# Patient Record
Sex: FEMALE | Race: WHITE | Employment: OTHER | URBAN - METROPOLITAN AREA
[De-identification: names, ages, dates, MRNs, and addresses within clinical notes are randomized per-mention and may not be internally consistent; named-entity substitution may affect disease eponyms.]

---

## 2017-01-19 ENCOUNTER — OFFICE VISIT (OUTPATIENT)
Dept: INTERNAL MEDICINE CLINIC | Age: 82
End: 2017-01-19

## 2017-01-19 VITALS
HEART RATE: 60 BPM | DIASTOLIC BLOOD PRESSURE: 70 MMHG | SYSTOLIC BLOOD PRESSURE: 130 MMHG | BODY MASS INDEX: 24.35 KG/M2 | WEIGHT: 124 LBS | HEIGHT: 60 IN

## 2017-01-19 DIAGNOSIS — M25.511 ACUTE PAIN OF RIGHT SHOULDER: Primary | ICD-10-CM

## 2017-01-19 PROCEDURE — 99213 OFFICE O/P EST LOW 20 MIN: CPT | Performed by: INTERNAL MEDICINE

## 2017-01-19 PROCEDURE — 20610 DRAIN/INJ JOINT/BURSA W/O US: CPT | Performed by: INTERNAL MEDICINE

## 2017-01-19 RX ORDER — TRIAMCINOLONE ACETONIDE 40 MG/ML
40 INJECTION, SUSPENSION INTRA-ARTICULAR; INTRAMUSCULAR ONCE
Status: DISCONTINUED | OUTPATIENT
Start: 2017-01-19 | End: 2017-12-22

## 2017-01-23 ENCOUNTER — OFFICE VISIT (OUTPATIENT)
Dept: INTERNAL MEDICINE CLINIC | Age: 82
End: 2017-01-23

## 2017-01-23 VITALS
SYSTOLIC BLOOD PRESSURE: 138 MMHG | DIASTOLIC BLOOD PRESSURE: 72 MMHG | BODY MASS INDEX: 24.35 KG/M2 | WEIGHT: 124 LBS | HEIGHT: 60 IN | HEART RATE: 88 BPM

## 2017-01-23 DIAGNOSIS — M25.511 ACUTE PAIN OF RIGHT SHOULDER: Primary | ICD-10-CM

## 2017-01-23 PROCEDURE — 99213 OFFICE O/P EST LOW 20 MIN: CPT | Performed by: INTERNAL MEDICINE

## 2017-02-14 ENCOUNTER — TELEPHONE (OUTPATIENT)
Dept: OTHER | Facility: CLINIC | Age: 82
End: 2017-02-14

## 2017-03-23 ENCOUNTER — OFFICE VISIT (OUTPATIENT)
Dept: INTERNAL MEDICINE CLINIC | Age: 82
End: 2017-03-23

## 2017-03-23 VITALS
HEIGHT: 60 IN | WEIGHT: 118 LBS | SYSTOLIC BLOOD PRESSURE: 118 MMHG | DIASTOLIC BLOOD PRESSURE: 70 MMHG | HEART RATE: 72 BPM | BODY MASS INDEX: 23.16 KG/M2

## 2017-03-23 DIAGNOSIS — N18.30 CKD (CHRONIC KIDNEY DISEASE), STAGE III (HCC): ICD-10-CM

## 2017-03-23 DIAGNOSIS — E78.5 DYSLIPIDEMIA: ICD-10-CM

## 2017-03-23 DIAGNOSIS — I10 ESSENTIAL HYPERTENSION, BENIGN: Primary | ICD-10-CM

## 2017-03-23 LAB
ALBUMIN SERPL-MCNC: 3.8 G/DL (ref 3.4–5)
ANION GAP SERPL CALCULATED.3IONS-SCNC: 18 MMOL/L (ref 3–16)
BUN BLDV-MCNC: 21 MG/DL (ref 7–20)
CALCIUM SERPL-MCNC: 10.4 MG/DL (ref 8.3–10.6)
CHLORIDE BLD-SCNC: 99 MMOL/L (ref 99–110)
CHOLESTEROL, TOTAL: 155 MG/DL (ref 0–199)
CO2: 25 MMOL/L (ref 21–32)
CREAT SERPL-MCNC: 1.1 MG/DL (ref 0.6–1.2)
GFR AFRICAN AMERICAN: 57
GFR NON-AFRICAN AMERICAN: 47
GLUCOSE BLD-MCNC: 91 MG/DL (ref 70–99)
HDLC SERPL-MCNC: 67 MG/DL (ref 40–60)
LDL CHOLESTEROL CALCULATED: 63 MG/DL
PHOSPHORUS: 3.7 MG/DL (ref 2.5–4.9)
POTASSIUM SERPL-SCNC: 4.6 MMOL/L (ref 3.5–5.1)
SODIUM BLD-SCNC: 142 MMOL/L (ref 136–145)
TRIGL SERPL-MCNC: 126 MG/DL (ref 0–150)
VLDLC SERPL CALC-MCNC: 25 MG/DL

## 2017-03-23 PROCEDURE — 99213 OFFICE O/P EST LOW 20 MIN: CPT | Performed by: INTERNAL MEDICINE

## 2017-05-20 ENCOUNTER — HOSPITAL ENCOUNTER (OUTPATIENT)
Dept: OTHER | Age: 82
Discharge: HOME | End: 2017-05-20
Attending: FAMILY MEDICINE | Admitting: FAMILY MEDICINE

## 2017-05-20 DIAGNOSIS — R52 PAIN: ICD-10-CM

## 2017-09-19 ENCOUNTER — OFFICE VISIT (OUTPATIENT)
Dept: INTERNAL MEDICINE CLINIC | Age: 82
End: 2017-09-19

## 2017-09-19 VITALS
HEART RATE: 64 BPM | BODY MASS INDEX: 22.5 KG/M2 | DIASTOLIC BLOOD PRESSURE: 80 MMHG | WEIGHT: 114.6 LBS | SYSTOLIC BLOOD PRESSURE: 130 MMHG | HEIGHT: 60 IN

## 2017-09-19 DIAGNOSIS — L72.3 SEBACEOUS CYST: ICD-10-CM

## 2017-09-19 DIAGNOSIS — I10 ESSENTIAL HYPERTENSION: Primary | ICD-10-CM

## 2017-09-19 PROCEDURE — 3288F FALL RISK ASSESSMENT DOCD: CPT | Performed by: INTERNAL MEDICINE

## 2017-09-19 PROCEDURE — G8510 SCR DEP NEG, NO PLAN REQD: HCPCS | Performed by: INTERNAL MEDICINE

## 2017-09-19 PROCEDURE — 99213 OFFICE O/P EST LOW 20 MIN: CPT | Performed by: INTERNAL MEDICINE

## 2017-09-19 ASSESSMENT — PATIENT HEALTH QUESTIONNAIRE - PHQ9
2. FEELING DOWN, DEPRESSED OR HOPELESS: 0
SUM OF ALL RESPONSES TO PHQ9 QUESTIONS 1 & 2: 0
SUM OF ALL RESPONSES TO PHQ QUESTIONS 1-9: 0
1. LITTLE INTEREST OR PLEASURE IN DOING THINGS: 0

## 2017-09-25 ENCOUNTER — OFFICE VISIT (OUTPATIENT)
Dept: SURGERY | Age: 82
End: 2017-09-25

## 2017-09-25 VITALS
WEIGHT: 119 LBS | DIASTOLIC BLOOD PRESSURE: 60 MMHG | HEIGHT: 63 IN | SYSTOLIC BLOOD PRESSURE: 126 MMHG | BODY MASS INDEX: 21.09 KG/M2

## 2017-09-25 DIAGNOSIS — L72.3 SEBACEOUS CYST: Primary | ICD-10-CM

## 2017-09-25 PROCEDURE — 99201 PR OFFICE OUTPATIENT NEW 10 MINUTES: CPT | Performed by: SURGERY

## 2017-09-25 ASSESSMENT — ENCOUNTER SYMPTOMS
EYES NEGATIVE: 1
GASTROINTESTINAL NEGATIVE: 1
RESPIRATORY NEGATIVE: 1
ALLERGIC/IMMUNOLOGIC NEGATIVE: 1

## 2017-09-27 ENCOUNTER — PROCEDURE VISIT (OUTPATIENT)
Dept: SURGERY | Age: 82
End: 2017-09-27

## 2017-09-27 VITALS — DIASTOLIC BLOOD PRESSURE: 66 MMHG | SYSTOLIC BLOOD PRESSURE: 122 MMHG

## 2017-09-27 DIAGNOSIS — L72.3 SEBACEOUS CYST: Primary | ICD-10-CM

## 2017-09-27 PROCEDURE — 12032 INTMD RPR S/A/T/EXT 2.6-7.5: CPT | Performed by: SURGERY

## 2017-09-27 PROCEDURE — 11402 EXC TR-EXT B9+MARG 1.1-2 CM: CPT | Performed by: SURGERY

## 2017-10-11 ENCOUNTER — OFFICE VISIT (OUTPATIENT)
Dept: SURGERY | Age: 82
End: 2017-10-11

## 2017-10-11 VITALS — SYSTOLIC BLOOD PRESSURE: 144 MMHG | BODY MASS INDEX: 20.19 KG/M2 | WEIGHT: 114 LBS | DIASTOLIC BLOOD PRESSURE: 60 MMHG

## 2017-10-11 DIAGNOSIS — L72.3 SEBACEOUS CYST: Primary | ICD-10-CM

## 2017-10-11 PROCEDURE — 99024 POSTOP FOLLOW-UP VISIT: CPT | Performed by: SURGERY

## 2017-10-11 NOTE — PROGRESS NOTES
Subjective:      Patient ID: Carolina Patino is a 80 y.o. female. HPI    Review of Systems    Objective:   Physical Exam  Vision healing well  Assessment:      43-year-old female status post excision of a sebaceous cyst of the upper back. Doing well postoperatively. Plan:      Follow-up as needed.

## 2017-12-15 ENCOUNTER — OFFICE VISIT (OUTPATIENT)
Dept: INTERNAL MEDICINE CLINIC | Age: 82
End: 2017-12-15

## 2017-12-15 VITALS
SYSTOLIC BLOOD PRESSURE: 136 MMHG | WEIGHT: 116 LBS | DIASTOLIC BLOOD PRESSURE: 84 MMHG | HEART RATE: 80 BPM | BODY MASS INDEX: 20.55 KG/M2 | HEIGHT: 63 IN

## 2017-12-15 DIAGNOSIS — M25.511 ACUTE PAIN OF RIGHT SHOULDER: Primary | ICD-10-CM

## 2017-12-15 PROCEDURE — 99213 OFFICE O/P EST LOW 20 MIN: CPT | Performed by: NURSE PRACTITIONER

## 2017-12-18 ASSESSMENT — ENCOUNTER SYMPTOMS: SHORTNESS OF BREATH: 0

## 2017-12-22 ENCOUNTER — OFFICE VISIT (OUTPATIENT)
Dept: INTERNAL MEDICINE CLINIC | Age: 82
End: 2017-12-22

## 2017-12-22 VITALS
HEART RATE: 72 BPM | DIASTOLIC BLOOD PRESSURE: 84 MMHG | SYSTOLIC BLOOD PRESSURE: 132 MMHG | WEIGHT: 114 LBS | HEIGHT: 63 IN | BODY MASS INDEX: 20.2 KG/M2

## 2017-12-22 DIAGNOSIS — M25.511 CHRONIC RIGHT SHOULDER PAIN: Primary | ICD-10-CM

## 2017-12-22 DIAGNOSIS — G89.29 CHRONIC RIGHT SHOULDER PAIN: Primary | ICD-10-CM

## 2017-12-22 PROCEDURE — 99213 OFFICE O/P EST LOW 20 MIN: CPT | Performed by: NURSE PRACTITIONER

## 2017-12-22 PROCEDURE — 20610 DRAIN/INJ JOINT/BURSA W/O US: CPT | Performed by: NURSE PRACTITIONER

## 2017-12-22 RX ORDER — LIDOCAINE HYDROCHLORIDE 10 MG/ML
1.5 INJECTION, SOLUTION EPIDURAL; INFILTRATION; INTRACAUDAL; PERINEURAL ONCE
Status: DISCONTINUED | OUTPATIENT
Start: 2017-12-22 | End: 2019-06-03

## 2017-12-22 RX ORDER — TRIAMCINOLONE ACETONIDE 40 MG/ML
20 INJECTION, SUSPENSION INTRA-ARTICULAR; INTRAMUSCULAR ONCE
Status: DISCONTINUED | OUTPATIENT
Start: 2017-12-22 | End: 2019-06-03

## 2017-12-22 NOTE — PROGRESS NOTES
Subjective:      Patient ID: Andrae Gaviria is a 80 y.o. female. CC: Right shoulder pain    HPI: Point of 2-3 days of right shoulder pain with no known injuries or traumas. Her discomfort was located on the lateral shoulder and lateral upper arm. Tenderness of the posterior lateral shoulder. The source of her pain was felt to be degenerative in nature with possible bursitis. She was placed on an anti-inflammatory regimen including Tylenol, heat and ice and asked to perform shoulder exercises. She has been doing this but returns today with ongoing shoulder pain. It is not worse but it also is minimally, if at all, improved. Review of Systems   Musculoskeletal: Positive for arthralgias. Objective:   Physical Exam   Constitutional: She appears well-developed and well-nourished. No distress. HENT:   Head: Normocephalic and atraumatic. Musculoskeletal:        Right shoulder: She exhibits tenderness, crepitus and pain. She exhibits normal range of motion, no bony tenderness, no swelling, no deformity and normal strength. There is some tenderness to palpation over the subacromial space with no appreciable swelling. She also has tenderness on the posterior shoulder. She has crepitus with shoulder rotation. Her range of motion appears grossly intact and she denies any pain with extension, flexion, internal, and external rotation. Skin: Skin is warm and dry. No rash noted. She is not diaphoretic. /84   Pulse 72   Ht 5' 3\" (1.6 m)   Wt 114 lb (51.7 kg)   BMI 20.19 kg/m²         Assessment/Plan:  Gorge Munoz was seen today for follow-up. Diagnoses and all orders for this visit:    Chronic right shoulder pain  - No relief with conservative measures including Tylenol, heat and ice, or exercises  - Discuss next steps including x-rays or joint injection.   She would like to proceed with steroid injection  - See procedure note  -     [id number] - SC DRAIN/INJECT LARGE JOINT/BURSA  -

## 2018-03-06 ENCOUNTER — TELEPHONE (OUTPATIENT)
Dept: INTERNAL MEDICINE CLINIC | Age: 83
End: 2018-03-06

## 2018-03-06 DIAGNOSIS — E78.00 HYPERCHOLESTEROLEMIA: Primary | ICD-10-CM

## 2018-03-07 NOTE — TELEPHONE ENCOUNTER
Dr. Amanda Lindsey: This patient has an upcoming appointment with you for Hyperlipidemia. In planning for that visit I have completed the following pre-visit planning:     Pre-Visit Planning Checklist:  Patient contacted: yes  Verified patient by name and date of birth: yes    Health Maintenance items reviewed:    Pneumonia Vaccination:  patient would like to discuss at next visit. Labs and procedures pended: Fasting Lipid Panel  and Renal Panel    Labs and procedures discussed with patient: yes  Reminded patient to check with their insurance company about coverage for lab tests and lab location: yes    Preliminary Medication Reconciliation: was performed. Reminded patient to bring medications to appointment: no    Reminded patient to arrive early: yes    Other notes: Patient will have lab work completed at her upcoming appointment. Please complete the med-reconciliation and sign the appropriate labs as soon as possible.       Claudia Savage  Pre-Services Specialist

## 2018-03-19 ENCOUNTER — OFFICE VISIT (OUTPATIENT)
Dept: INTERNAL MEDICINE CLINIC | Age: 83
End: 2018-03-19

## 2018-03-19 VITALS
WEIGHT: 112 LBS | DIASTOLIC BLOOD PRESSURE: 84 MMHG | SYSTOLIC BLOOD PRESSURE: 128 MMHG | HEIGHT: 63 IN | BODY MASS INDEX: 19.84 KG/M2 | HEART RATE: 72 BPM

## 2018-03-19 DIAGNOSIS — I10 ESSENTIAL HYPERTENSION, BENIGN: Primary | ICD-10-CM

## 2018-03-19 DIAGNOSIS — Z23 NEED FOR PNEUMOCOCCAL VACCINATION: ICD-10-CM

## 2018-03-19 PROCEDURE — 99213 OFFICE O/P EST LOW 20 MIN: CPT | Performed by: INTERNAL MEDICINE

## 2018-03-19 PROCEDURE — G0009 ADMIN PNEUMOCOCCAL VACCINE: HCPCS | Performed by: INTERNAL MEDICINE

## 2018-03-19 PROCEDURE — 90732 PPSV23 VACC 2 YRS+ SUBQ/IM: CPT | Performed by: INTERNAL MEDICINE

## 2018-09-06 ENCOUNTER — TELEPHONE (OUTPATIENT)
Dept: FAMILY MEDICINE CLINIC | Age: 83
End: 2018-09-06

## 2018-09-06 DIAGNOSIS — E78.00 HYPERCHOLESTEROLEMIA: Primary | ICD-10-CM

## 2018-09-18 ENCOUNTER — OFFICE VISIT (OUTPATIENT)
Dept: INTERNAL MEDICINE CLINIC | Age: 83
End: 2018-09-18

## 2018-09-18 VITALS
HEIGHT: 63 IN | SYSTOLIC BLOOD PRESSURE: 138 MMHG | HEART RATE: 68 BPM | BODY MASS INDEX: 19.67 KG/M2 | DIASTOLIC BLOOD PRESSURE: 82 MMHG | WEIGHT: 111 LBS

## 2018-09-18 DIAGNOSIS — N18.30 CKD (CHRONIC KIDNEY DISEASE), STAGE III (HCC): ICD-10-CM

## 2018-09-18 DIAGNOSIS — I10 ESSENTIAL HYPERTENSION, BENIGN: Primary | ICD-10-CM

## 2018-09-18 LAB
ALBUMIN SERPL-MCNC: 4.2 G/DL (ref 3.4–5)
ANION GAP SERPL CALCULATED.3IONS-SCNC: 14 MMOL/L (ref 3–16)
BUN BLDV-MCNC: 16 MG/DL (ref 7–20)
CALCIUM SERPL-MCNC: 10.1 MG/DL (ref 8.3–10.6)
CHLORIDE BLD-SCNC: 103 MMOL/L (ref 99–110)
CO2: 25 MMOL/L (ref 21–32)
CREAT SERPL-MCNC: 0.9 MG/DL (ref 0.6–1.2)
GFR AFRICAN AMERICAN: >60
GFR NON-AFRICAN AMERICAN: 59
GLUCOSE BLD-MCNC: 89 MG/DL (ref 70–99)
PHOSPHORUS: 3.3 MG/DL (ref 2.5–4.9)
POTASSIUM SERPL-SCNC: 4.8 MMOL/L (ref 3.5–5.1)
SODIUM BLD-SCNC: 142 MMOL/L (ref 136–145)

## 2018-09-18 PROCEDURE — G8510 SCR DEP NEG, NO PLAN REQD: HCPCS | Performed by: INTERNAL MEDICINE

## 2018-09-18 PROCEDURE — 99213 OFFICE O/P EST LOW 20 MIN: CPT | Performed by: INTERNAL MEDICINE

## 2018-09-18 ASSESSMENT — PATIENT HEALTH QUESTIONNAIRE - PHQ9
1. LITTLE INTEREST OR PLEASURE IN DOING THINGS: 0
SUM OF ALL RESPONSES TO PHQ QUESTIONS 1-9: 0
2. FEELING DOWN, DEPRESSED OR HOPELESS: 0
SUM OF ALL RESPONSES TO PHQ QUESTIONS 1-9: 0
SUM OF ALL RESPONSES TO PHQ9 QUESTIONS 1 & 2: 0

## 2019-01-29 ENCOUNTER — OFFICE VISIT (OUTPATIENT)
Dept: INTERNAL MEDICINE CLINIC | Age: 84
End: 2019-01-29
Payer: MEDICARE

## 2019-01-29 VITALS
HEART RATE: 84 BPM | SYSTOLIC BLOOD PRESSURE: 132 MMHG | BODY MASS INDEX: 19.67 KG/M2 | HEIGHT: 63 IN | DIASTOLIC BLOOD PRESSURE: 84 MMHG | WEIGHT: 111 LBS

## 2019-01-29 DIAGNOSIS — Z01.818 PREOP EXAMINATION: Primary | ICD-10-CM

## 2019-01-29 DIAGNOSIS — H25.13 AGE-RELATED NUCLEAR CATARACT OF BOTH EYES: ICD-10-CM

## 2019-01-29 PROCEDURE — 99214 OFFICE O/P EST MOD 30 MIN: CPT | Performed by: NURSE PRACTITIONER

## 2019-06-03 ENCOUNTER — OFFICE VISIT (OUTPATIENT)
Dept: INTERNAL MEDICINE CLINIC | Age: 84
End: 2019-06-03
Payer: MEDICARE

## 2019-06-03 VITALS
HEART RATE: 64 BPM | WEIGHT: 106 LBS | HEIGHT: 63 IN | TEMPERATURE: 98.2 F | SYSTOLIC BLOOD PRESSURE: 130 MMHG | DIASTOLIC BLOOD PRESSURE: 72 MMHG | BODY MASS INDEX: 18.78 KG/M2

## 2019-06-03 DIAGNOSIS — R19.7 ACUTE DIARRHEA: Primary | ICD-10-CM

## 2019-06-03 PROCEDURE — 99213 OFFICE O/P EST LOW 20 MIN: CPT | Performed by: INTERNAL MEDICINE

## 2019-06-07 ENCOUNTER — TELEPHONE (OUTPATIENT)
Dept: INTERNAL MEDICINE CLINIC | Age: 84
End: 2019-06-07

## 2019-06-07 DIAGNOSIS — R19.7 DIARRHEA, UNSPECIFIED TYPE: Primary | ICD-10-CM

## 2019-06-07 RX ORDER — DIPHENOXYLATE HYDROCHLORIDE AND ATROPINE SULFATE 2.5; .025 MG/1; MG/1
1 TABLET ORAL 4 TIMES DAILY PRN
Qty: 20 TABLET | Refills: 0 | Status: SHIPPED | OUTPATIENT
Start: 2019-06-07 | End: 2019-06-17

## 2019-06-07 NOTE — TELEPHONE ENCOUNTER
Pt was in on Mon but she has developed diarrhea since then. She is taking imodium which is helping some.     Kathy on file

## 2019-12-03 ENCOUNTER — OFFICE VISIT (OUTPATIENT)
Dept: INTERNAL MEDICINE CLINIC | Age: 84
End: 2019-12-03
Payer: MEDICARE

## 2019-12-03 VITALS
SYSTOLIC BLOOD PRESSURE: 126 MMHG | HEART RATE: 64 BPM | DIASTOLIC BLOOD PRESSURE: 68 MMHG | HEIGHT: 63 IN | WEIGHT: 103 LBS | BODY MASS INDEX: 18.25 KG/M2

## 2019-12-03 DIAGNOSIS — I10 ESSENTIAL HYPERTENSION, BENIGN: Primary | ICD-10-CM

## 2019-12-03 DIAGNOSIS — N18.30 CKD (CHRONIC KIDNEY DISEASE), STAGE III (HCC): ICD-10-CM

## 2019-12-03 LAB
ALBUMIN SERPL-MCNC: 3.8 G/DL (ref 3.4–5)
ANION GAP SERPL CALCULATED.3IONS-SCNC: 13 MMOL/L (ref 3–16)
BUN BLDV-MCNC: 17 MG/DL (ref 7–20)
CALCIUM SERPL-MCNC: 9.6 MG/DL (ref 8.3–10.6)
CHLORIDE BLD-SCNC: 103 MMOL/L (ref 99–110)
CO2: 24 MMOL/L (ref 21–32)
CREAT SERPL-MCNC: 1.1 MG/DL (ref 0.6–1.2)
GFR AFRICAN AMERICAN: 56
GFR NON-AFRICAN AMERICAN: 47
GLUCOSE BLD-MCNC: 102 MG/DL (ref 70–99)
PHOSPHORUS: 3.3 MG/DL (ref 2.5–4.9)
POTASSIUM SERPL-SCNC: 4.4 MMOL/L (ref 3.5–5.1)
SODIUM BLD-SCNC: 140 MMOL/L (ref 136–145)

## 2019-12-03 PROCEDURE — 99213 OFFICE O/P EST LOW 20 MIN: CPT | Performed by: INTERNAL MEDICINE

## 2020-04-13 ENCOUNTER — TELEPHONE (OUTPATIENT)
Dept: INTERNAL MEDICINE CLINIC | Age: 85
End: 2020-04-13

## 2020-04-13 ENCOUNTER — VIRTUAL VISIT (OUTPATIENT)
Dept: INTERNAL MEDICINE CLINIC | Age: 85
End: 2020-04-13
Payer: MEDICARE

## 2020-04-13 PROCEDURE — 99213 OFFICE O/P EST LOW 20 MIN: CPT | Performed by: NURSE PRACTITIONER

## 2020-04-13 ASSESSMENT — ENCOUNTER SYMPTOMS
SINUS PAIN: 0
SINUS PRESSURE: 0
SORE THROAT: 0
FACIAL SWELLING: 1

## 2020-04-13 NOTE — PROGRESS NOTES
2020    TELEHEALTH EVALUATION -- Audio/Visual (During [facility]- public health emergency)    HPI:    Tiffanie Hill (:  1929) has requested an audio/video evaluation for the following concern(s):    Left facial and ear pain for 1 day. Pain left side of face, jaw, ear. No earache, ear drainage, fevers. No known injury or trauma. No dental concerns as all teeth pulled from upper jaw. No treatments at home. Review of Systems   Constitutional: Negative for fever. HENT: Positive for ear pain and facial swelling. Negative for congestion, dental problem, ear discharge, hearing loss, mouth sores, sinus pressure, sinus pain and sore throat. Prior to Visit Medications    Medication Sig Taking? Authorizing Provider   aspirin 81 MG tablet Take 81 mg by mouth daily as needed for Pain   Historical Provider, MD   Multiple Vitamin (MULTIVITAMIN PO) Take 1 tablet by mouth daily. Historical Provider, MD       Social History     Tobacco Use    Smoking status: Never Smoker    Smokeless tobacco: Never Used   Substance Use Topics    Alcohol use: Yes     Comment:  RARE GLASS OF WINE    Drug use: No            PHYSICAL EXAMINATION:  [ INSTRUCTIONS:  \"[x]\" Indicates a positive item  \"[]\" Indicates a negative item  -- DELETE ALL ITEMS NOT EXAMINED]  Vital Signs: (As obtained by patient/caregiver or practitioner observation)    Blood pressure-  Heart rate-    Respiratory rate-    Temperature-  Pulse oximetry-     Constitutional: [x] Appears well-developed and well-nourished [x] No apparent distress      [] Abnormal-   Mental status  [x] Alert and awake  [x] Oriented to person/place/time [x]Able to follow commands      Eyes:  EOM    [x]  Normal  [] Abnormal-  Sclera  [x]  Normal  [] Abnormal -         Discharge [x]  None visible  [] Abnormal -    HENT:   [x] Normocephalic, atraumatic.   [] Abnormal - points out area of pains seems to be around left TMJ  [x] Mouth/Throat: Mucous membranes are moist.     External Ears [x] Normal  [] Abnormal-     Neck: [x] No visualized mass     Pulmonary/Chest: [x] Respiratory effort normal.  [x] No visualized signs of difficulty breathing or respiratory distress        [] Abnormal-      Musculoskeletal:   [] Normal gait with no signs of ataxia         [x] Normal range of motion of neck        [] Abnormal-       Neurological:        [x] No Facial Asymmetry (Cranial nerve 7 motor function) (limited exam to video visit)          [x] No gaze palsy        [] Abnormal-         Skin:        [x] No significant exanthematous lesions or discoloration noted on facial skin         [] Abnormal-            Psychiatric:       [x] Normal Affect [x] No Hallucinations        [] Abnormal-     Other pertinent observable physical exam findings-       ASSESSMENT/PLAN:  1. Left facial pain  - Seems to be around TMJ area by description  - Symptoms for 1 day, no treatments at home  - Will try Tylenol, cool/warm compresses  - If worsening, could try muscle relaxant, steroid but risky with age. May need face to face appt for hands on exam      No follow-ups on file. Juan Pablo Coleman is a 80 y.o. female being evaluated by a Virtual Visit (video visit) encounter to address concerns as mentioned above. A caregiver was present when appropriate. Due to this being a TeleHealth encounter (During TVYKY-88 public health emergency), evaluation of the following organ systems was limited: Vitals/Constitutional/EENT/Resp/CV/GI//MS/Neuro/Skin/Heme-Lymph-Imm. Pursuant to the emergency declaration under the [facility], [address] [place] [address] authority and the [employer] and [employer] Act, this Virtual Visit was conducted with patient's (and/or legal guardian's) consent, to reduce the patient's risk of exposure to COVID-19 and provide necessary medical care.   The patient (and/or legal guardian) has also been advised to contact this office for worsening conditions or problems, and seek emergency medical treatment and/or call 911 if deemed necessary. Services were provided through a video synchronous discussion virtually to substitute for in-person clinic visit. Patient and provider were located at their individual homes. --QUYNH Carroll CNP on 4/13/2020 at 1:59 PM    An electronic signature was used to authenticate this note.

## 2020-04-13 NOTE — TELEPHONE ENCOUNTER
PT called to say her left ear and side of face is swollen, uncomfortable and in a little bit of pain-aches, when chewing. It's difficult to eat. Can not do VV. Please call to advise.

## 2020-06-01 ENCOUNTER — TELEPHONE (OUTPATIENT)
Dept: INTERNAL MEDICINE CLINIC | Age: 85
End: 2020-06-01

## 2020-06-01 NOTE — TELEPHONE ENCOUNTER
ECC received a call from:    Name of Caller: Greg Dickson    Relationship to patient: daughter    Organization name: n/a    Best contact number: [phone]    Reason for call: PT's daughter called to cancel appointment for 6/3 because PT is doing well. They need to reschedule for in office.  Please call back

## 2020-09-03 ENCOUNTER — VIRTUAL VISIT (OUTPATIENT)
Dept: INTERNAL MEDICINE CLINIC | Age: 85
End: 2020-09-03
Payer: MEDICARE

## 2020-09-03 DIAGNOSIS — R11.0 NAUSEA: ICD-10-CM

## 2020-09-03 DIAGNOSIS — R42 LIGHTHEADEDNESS: ICD-10-CM

## 2020-09-03 LAB
ANION GAP SERPL CALCULATED.3IONS-SCNC: 14 MMOL/L (ref 3–16)
BILIRUBIN URINE: ABNORMAL
BLOOD, URINE: NEGATIVE
BUN BLDV-MCNC: 18 MG/DL (ref 7–20)
CALCIUM SERPL-MCNC: 9.9 MG/DL (ref 8.3–10.6)
CHLORIDE BLD-SCNC: 104 MMOL/L (ref 99–110)
CLARITY: ABNORMAL
CO2: 23 MMOL/L (ref 21–32)
COLOR: ABNORMAL
CREAT SERPL-MCNC: 0.9 MG/DL (ref 0.6–1.2)
CRYSTALS, UA: ABNORMAL /HPF
EPITHELIAL CELLS, UA: 4 /HPF (ref 0–5)
GFR AFRICAN AMERICAN: >60
GFR NON-AFRICAN AMERICAN: 59
GLUCOSE BLD-MCNC: 141 MG/DL (ref 70–99)
GLUCOSE URINE: NEGATIVE MG/DL
HCT VFR BLD CALC: 44.4 % (ref 36–48)
HEMOGLOBIN: 14.9 G/DL (ref 12–16)
HYALINE CASTS: 2 /LPF (ref 0–8)
KETONES, URINE: NEGATIVE MG/DL
LEUKOCYTE ESTERASE, URINE: NEGATIVE
MCH RBC QN AUTO: 31.8 PG (ref 26–34)
MCHC RBC AUTO-ENTMCNC: 33.6 G/DL (ref 31–36)
MCV RBC AUTO: 94.6 FL (ref 80–100)
MICROSCOPIC EXAMINATION: YES
NITRITE, URINE: NEGATIVE
PDW BLD-RTO: 14.7 % (ref 12.4–15.4)
PH UA: 5.5 (ref 5–8)
PLATELET # BLD: 234 K/UL (ref 135–450)
PMV BLD AUTO: 8.1 FL (ref 5–10.5)
POTASSIUM SERPL-SCNC: 4.2 MMOL/L (ref 3.5–5.1)
PROTEIN UA: ABNORMAL MG/DL
RBC # BLD: 4.69 M/UL (ref 4–5.2)
RBC UA: 4 /HPF (ref 0–4)
SODIUM BLD-SCNC: 141 MMOL/L (ref 136–145)
SPECIFIC GRAVITY UA: 1.03 (ref 1–1.03)
URINE TYPE: ABNORMAL
UROBILINOGEN, URINE: 1 E.U./DL
WBC # BLD: 9.2 K/UL (ref 4–11)
WBC UA: 8 /HPF (ref 0–5)

## 2020-09-03 PROCEDURE — 3288F FALL RISK ASSESSMENT DOCD: CPT | Performed by: NURSE PRACTITIONER

## 2020-09-03 PROCEDURE — 99213 OFFICE O/P EST LOW 20 MIN: CPT | Performed by: NURSE PRACTITIONER

## 2020-09-03 PROCEDURE — G8510 SCR DEP NEG, NO PLAN REQD: HCPCS | Performed by: NURSE PRACTITIONER

## 2020-09-03 RX ORDER — ONDANSETRON 4 MG/1
4 TABLET, ORALLY DISINTEGRATING ORAL 3 TIMES DAILY PRN
Qty: 21 TABLET | Refills: 0 | Status: SHIPPED | OUTPATIENT
Start: 2020-09-03 | End: 2020-10-12 | Stop reason: ALTCHOICE

## 2020-09-03 ASSESSMENT — ENCOUNTER SYMPTOMS
CONSTIPATION: 0
NAUSEA: 1
SHORTNESS OF BREATH: 0
WHEEZING: 0
CHEST TIGHTNESS: 0
COUGH: 0
ABDOMINAL PAIN: 0
VOMITING: 0
DIARRHEA: 0
BLOOD IN STOOL: 0

## 2020-09-03 ASSESSMENT — PATIENT HEALTH QUESTIONNAIRE - PHQ9
SUM OF ALL RESPONSES TO PHQ9 QUESTIONS 1 & 2: 0
SUM OF ALL RESPONSES TO PHQ QUESTIONS 1-9: 0
2. FEELING DOWN, DEPRESSED OR HOPELESS: 0
1. LITTLE INTEREST OR PLEASURE IN DOING THINGS: 0
SUM OF ALL RESPONSES TO PHQ QUESTIONS 1-9: 0

## 2020-09-03 NOTE — PROGRESS NOTES
9/3/2020    TELEHEALTH EVALUATION -- Audio/Visual (During ECNCU-37 public health emergency)    HPI:    Jayce Landa (:  1929) has requested an audio/video evaluation for the following concern(s):    VV for nausea and lightheaded that started yesterday   She denies vertigo or dizziness. She denies any CP, palpitations. No improvement today   Not feeling worse. Had some oatmeal last night, coffee and cereal this am.   She is not sure if she is taking in much water. Denies any diarrhea, emesis. Review of Systems   Constitutional: Negative for chills, fatigue and fever. Respiratory: Negative for cough, chest tightness, shortness of breath and wheezing. Cardiovascular: Negative for chest pain, palpitations and leg swelling. Gastrointestinal: Positive for nausea. Negative for abdominal pain, blood in stool, constipation, diarrhea and vomiting. Genitourinary: Negative for difficulty urinating, dysuria, frequency, hematuria and urgency. Neurological: Positive for light-headedness. Negative for dizziness, tremors, syncope, weakness and headaches. Prior to Visit Medications    Medication Sig Taking? Authorizing Provider   Naproxen Sodium (ALEVE PO) Take by mouth as needed Yes Historical Provider, MD   ondansetron (ZOFRAN-ODT) 4 MG disintegrating tablet Take 1 tablet by mouth 3 times daily as needed for Nausea or Vomiting Yes QUYNH Choi CNP   Multiple Vitamin (MULTIVITAMIN PO) Take 1 tablet by mouth daily.  Yes Historical Provider, MD     Social History     Tobacco Use    Smoking status: Never Smoker    Smokeless tobacco: Never Used   Substance Use Topics    Alcohol use: Yes     Comment:  RARE GLASS OF WINE    Drug use: No          PHYSICAL EXAMINATION:  [ INSTRUCTIONS:  \"[x]\" Indicates a positive item  \"[]\" Indicates a negative item  -- DELETE ALL ITEMS NOT EXAMINED]  Vital Signs: (As obtained by patient/caregiver or practitioner observation)    Patient-Reported Vitals 9/3/2020   Patient-Reported Weight 100   Patient-Reported Temperature 98.0       Physical Exam  Constitutional:       General: She is not in acute distress. Appearance: Normal appearance. She is not ill-appearing. HENT:      Head: Normocephalic and atraumatic. Pulmonary:      Effort: Pulmonary effort is normal. No respiratory distress. Neurological:      General: No focal deficit present. Mental Status: She is alert. Psychiatric:         Mood and Affect: Mood normal.         Behavior: Behavior normal.     Other pertinent observable physical exam findings-     Due to this being a TeleHealth encounter, evaluation of the following organ systems is limited: Vitals/Constitutional/EENT/Resp/CV/GI//MS/Neuro/Skin/Heme-Lymph-Imm. ASSESSMENT/PLAN:  1. Nausea  Stable, unclear etiology   Checking labs and urine given lightheadedness and age. zofran prn for nausea  Brat diet   Work on hydration   - Basic Metabolic Panel; Future  - CBC; Future  - URINALYSIS WITH MICROSCOPIC  - Culture, Urine  - ondansetron (ZOFRAN-ODT) 4 MG disintegrating tablet; Take 1 tablet by mouth 3 times daily as needed for Nausea or Vomiting  Dispense: 21 tablet; Refill: 0    2. Lightheadedness  See 1   - Basic Metabolic Panel; Future  - CBC; Future  - URINALYSIS WITH MICROSCOPIC  - Culture, Urine    3. Unspecified abnormal findings in urine   - Culture, Urine    Return if symptoms worsen or fail to improve. Reviewed criteria for follow up     An  electronic signature was used to authenticate this note.     --Rufina Franklyn, QUYNH - CNP on 9/3/2020 at 1:26 PM        Pursuant to the emergency declaration under the [facility], [facility] waiver authority and the [employer] and [employer] Act, this Virtual  Visit was conducted, with patient's consent, to reduce the patient's risk of exposure to COVID-19 and provide continuity of care for an established patient. Services were provided through a video synchronous discussion virtually to substitute for in-person clinic visit.

## 2020-09-04 ENCOUNTER — OFFICE VISIT (OUTPATIENT)
Dept: INTERNAL MEDICINE CLINIC | Age: 85
End: 2020-09-04
Payer: MEDICARE

## 2020-09-04 ENCOUNTER — TELEPHONE (OUTPATIENT)
Dept: INTERNAL MEDICINE CLINIC | Age: 85
End: 2020-09-04

## 2020-09-04 VITALS
BODY MASS INDEX: 17.4 KG/M2 | SYSTOLIC BLOOD PRESSURE: 128 MMHG | OXYGEN SATURATION: 98 % | WEIGHT: 98.2 LBS | HEART RATE: 70 BPM | TEMPERATURE: 97.9 F | DIASTOLIC BLOOD PRESSURE: 68 MMHG

## 2020-09-04 PROBLEM — R42 LIGHTHEADEDNESS: Status: ACTIVE | Noted: 2020-09-04

## 2020-09-04 PROBLEM — R11.0 NAUSEA: Status: ACTIVE | Noted: 2020-09-04

## 2020-09-04 LAB
BILIRUBIN, POC: NORMAL
BLOOD URINE, POC: NORMAL
CLARITY, POC: NORMAL
COLOR, POC: NORMAL
GLUCOSE URINE, POC: NORMAL
KETONES, POC: NORMAL
LEUKOCYTE EST, POC: NORMAL
NITRITE, POC: NORMAL
PH, POC: 5.5
PROTEIN, POC: NORMAL
SPECIFIC GRAVITY, POC: >=1.03
URINE CULTURE, ROUTINE: NORMAL
UROBILINOGEN, POC: 1

## 2020-09-04 PROCEDURE — 81002 URINALYSIS NONAUTO W/O SCOPE: CPT | Performed by: NURSE PRACTITIONER

## 2020-09-04 PROCEDURE — 99214 OFFICE O/P EST MOD 30 MIN: CPT | Performed by: NURSE PRACTITIONER

## 2020-09-04 ASSESSMENT — ENCOUNTER SYMPTOMS
BACK PAIN: 0
VOMITING: 0
WHEEZING: 0
APNEA: 0
SINUS PRESSURE: 0
COUGH: 0
DIARRHEA: 0
EYE PAIN: 0
SHORTNESS OF BREATH: 0
BLOOD IN STOOL: 0
EYE REDNESS: 0
CHEST TIGHTNESS: 0
ABDOMINAL DISTENTION: 0
NAUSEA: 0
ABDOMINAL PAIN: 0
CONSTIPATION: 0
RHINORRHEA: 0

## 2020-09-04 NOTE — TELEPHONE ENCOUNTER
Pt's daughter Marcos Christianson called for lab results done yesterday.   Also, she states pt is very sick states spoke with someone yesterday was prescibed  medication and this am she is worst.    Please give Adri a call back #[phone]

## 2020-09-04 NOTE — TELEPHONE ENCOUNTER
She is sicker to her stomach more than she was yesterday. The zofran seems to be making her worse. She is not able to get out of bed due to the dizziness. Not sure if there is something else she can be given instead.

## 2020-09-04 NOTE — PROGRESS NOTES
20     Chief Complaint   Patient presents with    Nausea    Dizziness     HPI     Pt here today for intermittent nausea and intermittent lightheadedness since 20. She was seen yesterday on a virtual visit because she was feeling dizzy and nauseated. She was prescribed Zofran and she's tried it three times at home, but she reports it gave her a headaches and it isn't helping to improve the nausea, possibly made dizziness worse. Denies chest pain, shortness of breath, or palpitations. She states that when she gets up from a sitting to standing position or when walking after waking up in the morning, she feels lightheaded. She denies heartburn. She also reports sinus headaches, but these are not new and fairly controlled. Denies phonophobia, photophobia, or sleep disturbances. Orthostatic BPs  Supine: 142/75, LUE  Sittin/64  Standin/68  lightheadedness and nausea reported with position change for orthostatic BP. She is here with daughter today. Allergies   Allergen Reactions    Codeine Nausea Only    Fluconazole Nausea Only     DIZZINESS-SEVERE     Current Outpatient Medications   Medication Sig Dispense Refill    Naproxen Sodium (ALEVE PO) Take by mouth as needed      ondansetron (ZOFRAN-ODT) 4 MG disintegrating tablet Take 1 tablet by mouth 3 times daily as needed for Nausea or Vomiting 21 tablet 0    Multiple Vitamin (MULTIVITAMIN PO) Take 1 tablet by mouth daily. No current facility-administered medications for this visit. Review of Systems   Constitutional: Negative for appetite change, chills, fatigue and fever. HENT: Negative for congestion, ear pain, rhinorrhea and sinus pressure. Eyes: Negative for pain, redness and visual disturbance. Respiratory: Negative for apnea, cough, chest tightness, shortness of breath and wheezing. Cardiovascular: Negative for chest pain, palpitations and leg swelling.    Gastrointestinal: Negative for abdominal distention, abdominal pain, blood in stool, constipation, diarrhea, nausea and vomiting. Endocrine: Negative for cold intolerance, heat intolerance, polydipsia, polyphagia and polyuria. Genitourinary: Negative for difficulty urinating, dysuria, enuresis (intermittent sinus headaches), frequency, hematuria and urgency. Musculoskeletal: Negative for back pain, gait problem, joint swelling and neck pain. Skin: Negative for rash and wound. Allergic/Immunologic: Negative for environmental allergies, food allergies and immunocompromised state. Neurological: Positive for dizziness and headaches. Negative for syncope, light-headedness and numbness. Hematological: Negative for adenopathy. Does not bruise/bleed easily. Psychiatric/Behavioral: Negative for agitation, behavioral problems and confusion. The patient is not nervous/anxious. Vitals:    09/04/20 1200 09/04/20 1202 09/04/20 1204 09/04/20 1207   BP: (!) 142/72 (!) 140/74 128/68 128/68   Site: Left Upper Arm Left Upper Arm Left Upper Arm Left Lower Arm   Position: Supine Sitting Standing Standing   Pulse:       Temp:       SpO2:       Weight:          Physical Exam  Vitals signs and nursing note reviewed. Constitutional:       General: She is not in acute distress. Appearance: Normal appearance. She is well-developed and normal weight. She is not ill-appearing, toxic-appearing or diaphoretic. HENT:      Head: Normocephalic and atraumatic. Neck:      Musculoskeletal: Normal range of motion. Cardiovascular:      Rate and Rhythm: Normal rate and regular rhythm. Heart sounds: Normal heart sounds. No murmur. No friction rub. No gallop. Pulmonary:      Effort: Pulmonary effort is normal. No respiratory distress. Breath sounds: Normal breath sounds. No stridor. No wheezing, rhonchi or rales. Abdominal:      Palpations: Abdomen is soft. Musculoskeletal: Normal range of motion.          General: No swelling, tenderness, deformity or signs of injury. Skin:     General: Skin is warm and dry. Coloration: Skin is not jaundiced or pale. Findings: No bruising, erythema, lesion or rash. Neurological:      General: No focal deficit present. Mental Status: She is alert and oriented to person, place, and time. Comments: CN II- XII intact   Psychiatric:         Mood and Affect: Mood and affect normal.         Behavior: Behavior normal.         Thought Content: Thought content normal.         Judgment: Judgment normal.       Assessment/Plan:  1. Lightheadedness  Stable, uncontrolled - mild and intermittent   No red flags today   - POCT Urinalysis no Micro - will wait on culture  Reviewed labs and UA from yesterday   - Increase dietary fluid intake - 1 glass of water in between meals  - No dietary sodium restriction  - Slow position changes for prevention     2. Nausea  Stable, uncontrolled - mild and intermittent   - Follow BRAT diet until nausea resolves  - Stop zofran - feeling worse with medication.  -Peppermint and sasha products can help    Discussed medications with patient, who voiced understanding of their use and indications. All questions answered. Reviewed strict criteria for follow up and when to seek emergency medical attention .      Electronically signed by QUYNH Wall CNP on 9/4/2020 at 12:20 PM

## 2020-09-04 NOTE — TELEPHONE ENCOUNTER
Stop the zofran and push fluids. She should be evaluated in person (office visit) since she is worsening. This was difficult to address yesterday in the VV. Please see if she can be seen this afternoon sometime.   Hector Aponte

## 2020-09-04 NOTE — PATIENT INSTRUCTIONS
· BRAT diet - Banana's, rice, applesauce, toast - bland diet  · No salt restriction  · Increase water intake - 1 glass of water in between meals  · STOP taking Zofran  · For nausea, peppermint and sasha products may help

## 2020-09-11 ENCOUNTER — TELEPHONE (OUTPATIENT)
Dept: INTERNAL MEDICINE CLINIC | Age: 85
End: 2020-09-11

## 2020-09-11 RX ORDER — OMEPRAZOLE 20 MG/1
20 CAPSULE, DELAYED RELEASE ORAL
Qty: 15 CAPSULE | Refills: 0 | Status: SHIPPED | OUTPATIENT
Start: 2020-09-11 | End: 2020-10-12 | Stop reason: ALTCHOICE

## 2020-10-12 ENCOUNTER — OFFICE VISIT (OUTPATIENT)
Dept: INTERNAL MEDICINE CLINIC | Age: 85
End: 2020-10-12
Payer: MEDICARE

## 2020-10-12 VITALS
WEIGHT: 98.6 LBS | HEART RATE: 68 BPM | TEMPERATURE: 96.5 F | HEIGHT: 63 IN | BODY MASS INDEX: 17.47 KG/M2 | DIASTOLIC BLOOD PRESSURE: 72 MMHG | SYSTOLIC BLOOD PRESSURE: 110 MMHG

## 2020-10-12 PROCEDURE — 90694 VACC AIIV4 NO PRSRV 0.5ML IM: CPT | Performed by: INTERNAL MEDICINE

## 2020-10-12 PROCEDURE — G0438 PPPS, INITIAL VISIT: HCPCS | Performed by: INTERNAL MEDICINE

## 2020-10-12 PROCEDURE — G0008 ADMIN INFLUENZA VIRUS VAC: HCPCS | Performed by: INTERNAL MEDICINE

## 2020-10-12 ASSESSMENT — PATIENT HEALTH QUESTIONNAIRE - PHQ9
2. FEELING DOWN, DEPRESSED OR HOPELESS: 0
SUM OF ALL RESPONSES TO PHQ9 QUESTIONS 1 & 2: 0
SUM OF ALL RESPONSES TO PHQ QUESTIONS 1-9: 0
1. LITTLE INTEREST OR PLEASURE IN DOING THINGS: 0
SUM OF ALL RESPONSES TO PHQ QUESTIONS 1-9: 0

## 2020-10-12 ASSESSMENT — LIFESTYLE VARIABLES: HOW OFTEN DO YOU HAVE A DRINK CONTAINING ALCOHOL: 0

## 2020-10-12 NOTE — PROGRESS NOTES
Medicare Annual Wellness Visit  Name: Josep Borrego Date: 10/12/2020   MRN: [id number] Sex: Female   Age: 80 y.o. Ethnicity: Non-/Non    : 1929 Race: Matt Ramírez is here for Medicare AWV    Screenings for behavioral, psychosocial and functional/safety risks, and cognitive dysfunction are all negative except as indicated below. These results, as well as other patient data from the [address] [address] [address] form, are documented in Flowsheets linked to this Encounter. Allergies   Allergen Reactions    Codeine Nausea Only    Fluconazole Nausea Only     DIZZINESS-SEVERE    Zofran [Ondansetron Hcl]        Prior to Visit Medications    Medication Sig Taking? Authorizing Provider   Multiple Vitamin (MULTIVITAMIN PO) Take 1 tablet by mouth daily.  Yes Historical Provider, MD   Naproxen Sodium (ALEVE PO) Take by mouth as needed  Historical Provider, MD       Past Medical History:   Diagnosis Date    Achalasia     Arthritis     Dysphagia     HYPERCHOLESTERAEMIA     Hypertension     Nausea & vomiting     Sinus trouble        Past Surgical History:   Procedure Laterality Date    ARTHROPLASTY  4-28-10    RIGHT TOTAL KNEE    BREAST SURGERY      RIGHT AND LEFT-CYSTS    ENDOSCOPY, COLON, DIAGNOSTIC      JOINT REPLACEMENT      RIGHT KNEE    KNEE ARTHROSCOPY      RIGHT AND LEFT    TONSILLECTOMY      UPPER GASTROINTESTINAL ENDOSCOPY      UPPER GASTROINTESTINAL ENDOSCOPY      WITH BOTOX    UPPER GASTROINTESTINAL ENDOSCOPY N/A 10/1/2013    Procedure: Esophagogastroduodenoscopy with Foreign body removal, Botulinum toxin injection of lower esophageal sphincter (LES) and balloon esophageal dilation    UPPER GASTROINTESTINAL ENDOSCOPY  3/3/15    WITH ESOPHAGEAL DILATATION AND BRUSHINGS, BOTOX INJECTION       Family History   Problem Relation Age of Onset    Rheum Arthritis Mother     Heart Disease Mother     Heart Disease Sister        CareTeam (Including outside providers/suppliers regularly involved in providing care):   Patient Care Team:  Juan Jose Lizama MD as PCP - General  Juan Jose Lizama MD as PCP - [facility] Empaneled Provider  Diana Bazan MD as Consulting Physician (General Surgery)    Wt Readings from Last 3 Encounters:   10/12/20 98 lb 9.6 oz (44.7 kg)   09/04/20 98 lb 3.2 oz (44.5 kg)   12/03/19 103 lb (46.7 kg)     Vitals:    10/12/20 0935   BP: 110/72   Pulse: 68   Temp: 96.5 °F (35.8 °C)   TempSrc: Temporal   Weight: 98 lb 9.6 oz (44.7 kg)   Height: 5' 3\" (1.6 m)     Body mass index is 17.47 kg/m². Based upon direct observation of the patient, evaluation of cognition reveals recent and remote memory intact. GEN: WN/WD, NAD  CV: regular rate and rhythm, no murmurs rubs or gallops  Resp: normal effort, clear auscultation bilaterally  Abd: soft, nontender to palpation. No peripheral edema     Patient's complete Health Risk Assessment and screening values have been reviewed and are found in Flowsheets. The following problems were reviewed today and where indicated follow up appointments were made and/or referrals ordered.     Positive Risk Factor Screenings with Interventions:     Health Habits/Nutrition:  Health Habits/Nutrition  Do you exercise for at least 20 minutes 2-3 times per week?: Yes  Have you lost any weight without trying in the past 3 months?: No  Do you eat fewer than 2 meals per day?: No  Have you seen a dentist within the past year?: (!) No  Body mass index: (!) 17.46  Health Habits/Nutrition Interventions:  · Dental exam overdue:  patient encouraged to make appointment with his/her dentist   · Nutritional intake discussed- no concerns    Hearing/Vision:  No exam data present  Hearing/Vision  Do you or your family notice any trouble with your hearing?: (!) Yes  Do you have difficulty driving, watching TV, or doing any of your daily activities because of your eyesight?: No  Have you had an eye exam within the past year?: Yes  Hearing/Vision Interventions:  · Hearing concerns:  has functioning hearing aids    Personalized Preventive Plan   Current Health Maintenance Status  Immunization History   Administered Date(s) Administered    Influenza Vaccine, unspecified formulation 10/15/2015, 10/01/2017    Influenza Virus Vaccine 10/19/2014    Influenza Whole 10/17/2012    Influenza, Quadv, adjuvanted, 65 yrs +, IM, PF (Fluad) 10/12/2020    Pneumococcal Conjugate 13-valent ([id number]) 03/29/2016    Pneumococcal Conjugate 7-valent (Prevnar7) 11/19/2010    Pneumococcal Polysaccharide ([id number]) 03/19/2018    Zoster Live (Zostavax) 12/19/2014        Health Maintenance   Topic Date Due    Shingles Vaccine (2 of 3) 02/13/2015    Annual Wellness Visit (AWV)  05/29/2019    DTaP/Tdap/Td vaccine (1 - Tdap) 01/01/2099 (Originally 8/7/1948)    Potassium monitoring  09/03/2021    Creatinine monitoring  09/03/2021    Flu vaccine  Completed    Pneumococcal 65+ years Vaccine  Completed    Hepatitis A vaccine  Aged Out    Hepatitis B vaccine  Aged Out    Hib vaccine  Aged Out    Meningococcal (ACWY) vaccine  Aged Out     Recommendations for AB Tasty Due: see orders and patient instructions/AVS.  . Recommended screening schedule for the next 5-10 years is provided to the patient in written form: see Patient Annette Estrada was seen today for medicare awv.     Diagnoses and all orders for this visit:    Need for influenza vaccination    Routine general medical examination at a health care facility    Other orders  -     INFLUENZA, QUADV, ADJUVANTED, 65 YRS =, IM, PF, PREFILL SYR, 0.5ML (FLUAD)

## 2021-04-12 ENCOUNTER — OFFICE VISIT (OUTPATIENT)
Dept: INTERNAL MEDICINE CLINIC | Age: 86
End: 2021-04-12
Payer: MEDICARE

## 2021-04-12 VITALS
DIASTOLIC BLOOD PRESSURE: 70 MMHG | HEART RATE: 72 BPM | WEIGHT: 99.6 LBS | HEIGHT: 63 IN | SYSTOLIC BLOOD PRESSURE: 130 MMHG | BODY MASS INDEX: 17.65 KG/M2

## 2021-04-12 DIAGNOSIS — M19.90 ARTHRITIS: Primary | ICD-10-CM

## 2021-04-12 DIAGNOSIS — N18.31 STAGE 3A CHRONIC KIDNEY DISEASE (HCC): ICD-10-CM

## 2021-04-12 PROCEDURE — 99213 OFFICE O/P EST LOW 20 MIN: CPT | Performed by: INTERNAL MEDICINE

## 2021-04-12 ASSESSMENT — PATIENT HEALTH QUESTIONNAIRE - PHQ9
1. LITTLE INTEREST OR PLEASURE IN DOING THINGS: 0
SUM OF ALL RESPONSES TO PHQ QUESTIONS 1-9: 0
2. FEELING DOWN, DEPRESSED OR HOPELESS: 0
SUM OF ALL RESPONSES TO PHQ9 QUESTIONS 1 & 2: 0

## 2021-04-12 NOTE — PROGRESS NOTES
Chief Complaint   Patient presents with    Arthritis    Chronic Kidney Disease     HPI:   The patient is returning for chronic disease management. She is doing well at this time. She has chronic knee pain. She takes Aleve daily. This provides effective relief. She has chronic kidney disease. She denies any active symptoms at this time. She continues to live independently. She is able to complete her ADLs and IADLs. Review of systems:  Negative for chest pain, shortness of breath  She is having regular bowel movements  She denies urinary problems  Intermittent and occasional gastroesophageal reflux    Exam  /70   Pulse 72   Ht 5' 3\" (1.6 m)   Wt 99 lb 9.6 oz (45.2 kg)   BMI 17.64 kg/m²   GEN: WN/WD, NAD  CV: regular rate and rhythm, no murmurs rubs or gallops  Resp: normal effort, clear auscultation bilaterally  No peripheral edema     Lab Results   Component Value Date    CREATININE 0.9 09/03/2020    BUN 18 09/03/2020     09/03/2020    K 4.2 09/03/2020     09/03/2020    CO2 23 09/03/2020       A/P  1. Arthritis  Chronic and stable. She is getting effective relief with Aleve. Continue current treatment. 2. Stage 3a chronic kidney disease  Chronic and stable. She has been tolerating Aleve without significant kidney issues. We will monitor a renal panel at her next visit in 6 months. Blood pressure is under good control. Continue risk factor modification. Return in 6 months.

## 2021-10-12 ENCOUNTER — OFFICE VISIT (OUTPATIENT)
Dept: INTERNAL MEDICINE CLINIC | Age: 86
End: 2021-10-12
Payer: MEDICARE

## 2021-10-12 VITALS
HEIGHT: 63 IN | DIASTOLIC BLOOD PRESSURE: 70 MMHG | SYSTOLIC BLOOD PRESSURE: 122 MMHG | HEART RATE: 64 BPM | WEIGHT: 92.8 LBS | BODY MASS INDEX: 16.44 KG/M2

## 2021-10-12 DIAGNOSIS — M19.90 ARTHRITIS: Primary | ICD-10-CM

## 2021-10-12 DIAGNOSIS — N18.31 STAGE 3A CHRONIC KIDNEY DISEASE (HCC): ICD-10-CM

## 2021-10-12 DIAGNOSIS — Z23 NEED FOR INFLUENZA VACCINATION: ICD-10-CM

## 2021-10-12 PROCEDURE — 90694 VACC AIIV4 NO PRSRV 0.5ML IM: CPT | Performed by: INTERNAL MEDICINE

## 2021-10-12 PROCEDURE — G0008 ADMIN INFLUENZA VIRUS VAC: HCPCS | Performed by: INTERNAL MEDICINE

## 2021-10-12 PROCEDURE — 99213 OFFICE O/P EST LOW 20 MIN: CPT | Performed by: INTERNAL MEDICINE

## 2021-10-12 SDOH — ECONOMIC STABILITY: FOOD INSECURITY: WITHIN THE PAST 12 MONTHS, YOU WORRIED THAT YOUR FOOD WOULD RUN OUT BEFORE YOU GOT MONEY TO BUY MORE.: NEVER TRUE

## 2021-10-12 SDOH — ECONOMIC STABILITY: FOOD INSECURITY: WITHIN THE PAST 12 MONTHS, THE FOOD YOU BOUGHT JUST DIDN'T LAST AND YOU DIDN'T HAVE MONEY TO GET MORE.: NEVER TRUE

## 2021-10-12 ASSESSMENT — SOCIAL DETERMINANTS OF HEALTH (SDOH): HOW HARD IS IT FOR YOU TO PAY FOR THE VERY BASICS LIKE FOOD, HOUSING, MEDICAL CARE, AND HEATING?: NOT HARD AT ALL

## 2021-10-12 NOTE — PROGRESS NOTES
Chief complaint: Arthritis and chronic kidney disease    HPI:  The patient is returning for chronic disease management. She has arthritis primarily affecting her knees. She is able to walk her dog daily. She has occasional pain. She takes Aleve infrequently for relief of symptoms. She has stage III chronic kidney disease. She is currently asymptomatic. Exam  /70   Pulse 64   Ht 5' 3\" (1.6 m)   Wt 92 lb 12.8 oz (42.1 kg)   BMI 16.44 kg/m²   General: Elderly female who appears in good health  Cardiovascular: Regular rate rhythm, no murmurs, no peripheral edema  Respiratory: Effort is normal and breath sounds are clear    Lab Results   Component Value Date    CREATININE 0.9 09/03/2020    BUN 18 09/03/2020     09/03/2020    K 4.2 09/03/2020     09/03/2020    CO2 23 09/03/2020     A/P  1. Arthritis  Chronic and stable. Occasional use of NSAIDs is providing effective relief and there is not evidence of toxicity. She will continue current treatment. 2. Stage 3a chronic kidney disease (HCC)  Chronic and asymptomatic. Renal panel today. Blood pressure control is excellent. Continue risk factor modification.  - Renal Function Panel    3.  Need for influenza vaccination    - INFLUENZA, QUADV, ADJUVANTED, 65 YRS =, IM, PF, PREFILL SYR, 0.5ML (FLUAD)

## 2021-10-13 LAB
ALBUMIN SERPL-MCNC: 3.9 G/DL (ref 3.4–5)
ANION GAP SERPL CALCULATED.3IONS-SCNC: 15 MMOL/L (ref 3–16)
BUN BLDV-MCNC: 15 MG/DL (ref 7–20)
CALCIUM SERPL-MCNC: 10 MG/DL (ref 8.3–10.6)
CHLORIDE BLD-SCNC: 105 MMOL/L (ref 99–110)
CO2: 21 MMOL/L (ref 21–32)
CREAT SERPL-MCNC: 0.9 MG/DL (ref 0.6–1.2)
GFR AFRICAN AMERICAN: >60
GFR NON-AFRICAN AMERICAN: 59
GLUCOSE BLD-MCNC: 86 MG/DL (ref 70–99)
PHOSPHORUS: 3.8 MG/DL (ref 2.5–4.9)
POTASSIUM SERPL-SCNC: 4.4 MMOL/L (ref 3.5–5.1)
SODIUM BLD-SCNC: 141 MMOL/L (ref 136–145)

## 2021-10-19 ENCOUNTER — VIRTUAL VISIT (OUTPATIENT)
Dept: INTERNAL MEDICINE CLINIC | Age: 86
End: 2021-10-19
Payer: MEDICARE

## 2021-10-19 DIAGNOSIS — Z00.00 ROUTINE GENERAL MEDICAL EXAMINATION AT A HEALTH CARE FACILITY: Primary | ICD-10-CM

## 2021-10-19 PROCEDURE — G0439 PPPS, SUBSEQ VISIT: HCPCS | Performed by: INTERNAL MEDICINE

## 2021-10-19 ASSESSMENT — PATIENT HEALTH QUESTIONNAIRE - PHQ9
SUM OF ALL RESPONSES TO PHQ QUESTIONS 1-9: 0
SUM OF ALL RESPONSES TO PHQ QUESTIONS 1-9: 0
1. LITTLE INTEREST OR PLEASURE IN DOING THINGS: 0
2. FEELING DOWN, DEPRESSED OR HOPELESS: 0
SUM OF ALL RESPONSES TO PHQ QUESTIONS 1-9: 0
SUM OF ALL RESPONSES TO PHQ9 QUESTIONS 1 & 2: 0

## 2021-10-19 ASSESSMENT — LIFESTYLE VARIABLES: HOW OFTEN DO YOU HAVE A DRINK CONTAINING ALCOHOL: 0

## 2021-10-19 NOTE — PROGRESS NOTES
Medicare Annual Wellness Visit  Name: Anne Zepeda Date: 10/19/2021   MRN: [id number] Sex: Female   Age: 80 y.o. Ethnicity: Non- / Non    : 1929 Race: White (non-)      Sarita Dalton is here for Medicare AWV    Screenings for behavioral, psychosocial and functional/safety risks, and cognitive dysfunction are all negative except as indicated below. These results, as well as other patient data from the [address] [facility], are documented in Flowsheets linked to this Encounter. Allergies   Allergen Reactions    Codeine Nausea Only    Fluconazole Nausea Only     DIZZINESS-SEVERE    Zofran [Ondansetron Hcl]        Prior to Visit Medications    Medication Sig Taking? Authorizing Provider   Naproxen Sodium (ALEVE PO) Take by mouth as needed  Historical Provider, MD   Multiple Vitamin (MULTIVITAMIN PO) Take 1 tablet by mouth daily.   Historical Provider, MD       Past Medical History:   Diagnosis Date    Achalasia     Arthritis     Dysphagia     HYPERCHOLESTERAEMIA     Hypertension     Nausea & vomiting     Sinus trouble        Past Surgical History:   Procedure Laterality Date    ARTHROPLASTY  4-28-10    RIGHT TOTAL KNEE    BREAST SURGERY      RIGHT AND LEFT-CYSTS    ENDOSCOPY, COLON, DIAGNOSTIC      JOINT REPLACEMENT      RIGHT KNEE    KNEE ARTHROSCOPY      RIGHT AND LEFT    TONSILLECTOMY      UPPER GASTROINTESTINAL ENDOSCOPY      UPPER GASTROINTESTINAL ENDOSCOPY      WITH BOTOX    UPPER GASTROINTESTINAL ENDOSCOPY N/A 10/1/2013    Procedure: Esophagogastroduodenoscopy with Foreign body removal, Botulinum toxin injection of lower esophageal sphincter (LES) and balloon esophageal dilation    UPPER GASTROINTESTINAL ENDOSCOPY  3/3/15    WITH ESOPHAGEAL DILATATION AND BRUSHINGS, BOTOX INJECTION       Family History   Problem Relation Age of Onset    Rheum Arthritis Mother     Heart Disease Mother     Heart Disease Sister        CareTeam (Including outside providers/suppliers regularly involved in providing care):   Patient Care Team:  Kareen Sung MD as PCP - General  Kareen Sung MD as PCP - [facility] Empaneled Provider  Patrizia Hernandez MD as Consulting Physician (General Surgery)    Wt Readings from Last 3 Encounters:   10/12/21 92 lb 12.8 oz (42.1 kg)   04/12/21 99 lb 9.6 oz (45.2 kg)   10/12/20 98 lb 9.6 oz (44.7 kg)     She does not monitor her blood pressure at home. BMI 16.44 kg/m²    Based upon direct observation of the patient, evaluation of cognition reveals recent and remote memory intact. Patient's complete Health Risk Assessment and screening values have been reviewed and are found in Flowsheets. The following problems were reviewed today and where indicated follow up appointments were made and/or referrals ordered.     Positive Risk Factor Screenings with Interventions:           Health Habits/Nutrition:  Health Habits/Nutrition  Do you exercise for at least 20 minutes 2-3 times per week?: Yes  Have you lost any weight without trying in the past 3 months?: No  Do you eat only one meal per day?: No  Have you seen the dentist within the past year?: N/A - wear dentures     Health Habits/Nutrition Interventions:  · Nutritional issues:  no nutrional concerns reported       Personalized Preventive Plan   Current Health Maintenance Status  Immunization History   Administered Date(s) Administered    COVID-19, Moderna, PF, 100mcg/0.5mL 01/20/2021, 02/16/2021    Influenza Vaccine, unspecified formulation 10/15/2015, 10/01/2017    Influenza Virus Vaccine 10/19/2014    Influenza Whole 10/17/2012, 10/19/2014    Influenza, High Dose (Fluzone 65 yrs and older) 10/21/2016, 11/06/2018, 10/01/2019    Influenza, Quadv, adjuvanted, 65 yrs +, IM, PF (Fluad) 10/12/2020, 10/12/2021    Pneumococcal Conjugate 13-valent ([id number]) 03/29/2016    Pneumococcal Conjugate 7-valent (Prevnar7) 11/19/2010    Pneumococcal Polysaccharide ([id number]) 03/19/2018    Zoster Live (Zostavax) 12/19/2014        Health Maintenance   Topic Date Due    Shingles Vaccine (2 of 3) 02/13/2015    Annual Wellness Visit (AWV)  10/13/2021    DTaP/Tdap/Td vaccine (1 - Tdap) 01/01/2099 (Originally 8/7/1948)    Potassium monitoring  10/12/2022    Creatinine monitoring  10/12/2022    Flu vaccine  Completed    Pneumococcal 65+ years Vaccine  Completed    COVID-19 Vaccine  Completed    Hepatitis A vaccine  Aged Out    Hepatitis B vaccine  Aged Out    Hib vaccine  Aged Out    Meningococcal (ACWY) vaccine  Aged Out     Recommendations for Plan [employer] Labs Due: see orders and patient instructions/AVS.  . Recommended screening schedule for the next 5-10 years is provided to the patient in written form: see Patient Instructions/AVS.    Nichole REYNOSO RN, 10/19/2021, performed the documented evaluation under the direct supervision of the attending physician. Cassie Valle, was evaluated through a synchronous (real-time) phone encounter. The patient (or guardian if applicable) is aware that this is a billable service. Verbal consent to proceed has been obtained within the past 12 months. The visit was conducted pursuant to the emergency declaration under the [address] [place] [address] authority and the [employer] General Act. Patient identification was verified, and a caregiver was present when appropriate. The patient was located in a state where the provider was credentialed to provide care. Total time spent for this encounter: 25 minutes    --Annalee Stephenson RN on 10/19/2021 at 12:01 PM    An electronic signature was used to authenticate this note. This encounter was performed under Irma grimaldo MDs, direct supervision, 10/19/2021.

## 2021-10-19 NOTE — PATIENT INSTRUCTIONS
Personalized Preventive Plan for Carry Height - 10/19/2021  Medicare offers a range of preventive health benefits. Some of the tests and screenings are paid in full while other may be subject to a deductible, co-insurance, and/or copay. Some of these benefits include a comprehensive review of your medical history including lifestyle, illnesses that may run in your family, and various assessments and screenings as appropriate. After reviewing your medical record and screening and assessments performed today your provider may have ordered immunizations, labs, imaging, and/or referrals for you. A list of these orders (if applicable) as well as your Preventive Care list are included within your After Visit Summary for your review. Other Preventive Recommendations:    · A preventive eye exam performed by an eye specialist is recommended every 1-2 years to screen for glaucoma; cataracts, macular degeneration, and other eye disorders. · A preventive dental visit is recommended every 6 months. · Try to get at least 150 minutes of exercise per week or 10,000 steps per day on a pedometer . · Order or download the FREE \"Exercise & Physical Activity: Your Everyday Guide\" from [employer]. Call 3-773.461.1347 or search [employer] online. · You need 6045-2519 mg of calcium and 9998-1570 IU of vitamin D per day. It is possible to meet your calcium requirement with diet alone, but a vitamin D supplement is usually necessary to meet this goal.  · When exposed to the sun, use a sunscreen that protects against both UVA and UVB radiation with an SPF of 30 or greater. Reapply every 2 to 3 hours or after sweating, drying off with a towel, or swimming. · Always wear a seat belt when traveling in a car. Always wear a helmet when riding a bicycle or motorcycle.

## 2022-04-12 ENCOUNTER — OFFICE VISIT (OUTPATIENT)
Dept: INTERNAL MEDICINE CLINIC | Age: 87
End: 2022-04-12
Payer: MEDICARE

## 2022-04-12 VITALS
BODY MASS INDEX: 15.52 KG/M2 | SYSTOLIC BLOOD PRESSURE: 126 MMHG | WEIGHT: 87.6 LBS | HEIGHT: 63 IN | HEART RATE: 80 BPM | DIASTOLIC BLOOD PRESSURE: 68 MMHG

## 2022-04-12 DIAGNOSIS — R13.12 OROPHARYNGEAL DYSPHAGIA: Primary | ICD-10-CM

## 2022-04-12 DIAGNOSIS — R41.89 COGNITIVE IMPAIRMENT: ICD-10-CM

## 2022-04-12 DIAGNOSIS — N18.31 STAGE 3A CHRONIC KIDNEY DISEASE (HCC): ICD-10-CM

## 2022-04-12 PROBLEM — R11.0 NAUSEA: Status: RESOLVED | Noted: 2020-09-04 | Resolved: 2022-04-12

## 2022-04-12 PROBLEM — R42 LIGHTHEADEDNESS: Status: RESOLVED | Noted: 2020-09-04 | Resolved: 2022-04-12

## 2022-04-12 PROCEDURE — 99213 OFFICE O/P EST LOW 20 MIN: CPT | Performed by: INTERNAL MEDICINE

## 2022-04-12 RX ORDER — OMEPRAZOLE 20 MG/1
CAPSULE, DELAYED RELEASE ORAL
Qty: 90 CAPSULE | Refills: 0 | Status: SHIPPED | OUTPATIENT
Start: 2022-04-12 | End: 2022-07-22

## 2022-04-12 RX ORDER — OMEPRAZOLE 20 MG/1
20 CAPSULE, DELAYED RELEASE ORAL
Qty: 30 CAPSULE | Refills: 0 | Status: SHIPPED | OUTPATIENT
Start: 2022-04-12 | End: 2022-04-12

## 2022-04-12 NOTE — PROGRESS NOTES
Chief Complaint   Patient presents with    Arthritis    Chronic Kidney Disease    Dysphagia     Pts dtr states her food is just sitting in her throat. not able to go up or down. tried prilosec. down 5 lbs    Memory Loss     Pts dtr reports her short term memory is not good. HPI    Dysphagia: The patient reports solid foods are getting stuck in her throat. This has been worsening. She recently stopped using Prilosec. Memory problems: The patient's daughter reports progressive decline in memory. Issues are primarily with short-term memory recall. Exam  /68   Pulse 80   Ht 5' 3\" (1.6 m)   Wt 87 lb 9.6 oz (39.7 kg)   BMI 15.52 kg/m²   General: Elderly female appears in good health  Cardiovascular: Regular rate and rhythm, systolic murmur, no peripheral edema  Respiratory: Effort is normal and breath sounds are clear    Lab Results   Component Value Date    CREATININE 0.9 10/12/2021    BUN 15 10/12/2021     10/12/2021    K 4.4 10/12/2021     10/12/2021    CO2 21 10/12/2021     A/P  1. Oropharyngeal dysphagia  Motility d/o vs structural disease. Resume omeprazole 20mg daily  Will obtain Barium swallow eval  - FL MODIFIED BARIUM SWALLOW W VIDEO; Future    2. Stage 3a chronic kidney disease (HCC)  Chronic, table  Continue risk factor modification. 3. Cognitive impairment  Will need additional evaluation.   She will return for a Mental status exam (TRACIE) in June continue with med

## 2022-04-19 ENCOUNTER — HOSPITAL ENCOUNTER (OUTPATIENT)
Dept: GENERAL RADIOLOGY | Age: 87
Discharge: HOME | End: 2022-04-19
Payer: MEDICARE

## 2022-04-19 ENCOUNTER — HOSPITAL ENCOUNTER (OUTPATIENT)
Dept: SPEECH THERAPY | Age: 87
Setting detail: THERAPIES SERIES
Discharge: HOME | End: 2022-04-19
Payer: MEDICARE

## 2022-04-19 DIAGNOSIS — R13.12 OROPHARYNGEAL DYSPHAGIA: ICD-10-CM

## 2022-04-19 PROCEDURE — 92526 ORAL FUNCTION THERAPY: CPT

## 2022-04-19 PROCEDURE — 74230 X-RAY XM SWLNG FUNCJ C+: CPT

## 2022-04-19 PROCEDURE — 92611 MOTION FLUOROSCOPY/SWALLOW: CPT

## 2022-04-19 NOTE — PROCEDURES
Facility/Department: [facility] SPEECH THERAPY  MODIFIED BARIUM SWALLOW EVALUATION    Patient: Leticia Yuan   : 1929   MRN: [id number]  Evaluation Date: 2022   Admitting Diagnosis: Oropharyngeal dysphagia [R13.12]  Treatment Diagnosis: Dysphagia   Pain: None reported                        Ordering MD: Dr. David Curtis  Radiologist: Dr. Abilio Schafer  Date of Evaluation: 2022  Type of Study: Modified Barium Swallowing Study (MBSS)  Diet Prior to Study:  Regular diet, thin liquid  Reason for referral/HPI: Pt report of food getting stuck typically during the last 25% of her meal, causing emesis. Aspiration/Penetration Risk: Mild Risk    Impressions: Modified Barium Swallow evaluation completed on 2022. Patient presents with swallowing function that is grossly within functional limits for a person her age. Prolonged mastication was noted with regular solids. Pt with adequate oral acceptance and labial seal, functional oral clearance, adequate hyolaryngeal excursion, complete epiglottic inversion, clear stripping wave, timely swallow initiation, and adequate palatal elevation. Pt with x1 instance of laryngeal penetration of thins via tsp and x1 instance of shallow penetration rapid successive drinks of thin liquids via straw. Penetration was largely self clearing vs minimal stain in laryngeal inlet. Slow drainage was observed in the upper 1/3 of the esophagus during the swallow, recommend further assessment. Given the findings of this study recommend pt continue on a regular texture diet with thin liquids (no straws). Pt may benefit from an esophagram and/or referral to GI to further assess the esophageal phase of swallowing function. Recommendations:  Diet Level: Regular texture diet  with Thin liquids (No straws)  Medication: Whole with water     Strategies:  Alternate solids/liquids, Upright as possible with all PO intake, No straws, Small bites/sips, Eat/feed slowly, Remain upright 30-45 min, More frequent smaller meals  Treatments: No further dysphagia therapy follow-up appears indicated at this time    Referral: Based on MBSS findings and pt report, consider referral to gastroenterology (GI) for further assessment of esophageal phase of swallow (esophagram). Consistencies given: thin liquids, mildly (nectar) thick liquids, puree, soft solids and regular solids     Oral Phase  Prolonged mastication     Pharyngeal Phase  Intermittent penetration of thin liquids  No aspiration was viewed during study     Penetration/Aspiration Scores across consistencies   CONSISTENCY  Pen/Asp rating Description    Thin  1) Material does not enter the airway  2) Material enters the airway, remains above the vocal folds, and is ejected from the airway  x1 instance of penetration thins via spoon  x1 instance of penetration rapid successive thins via straw   Mildly (nectar) thick   1) Material does not enter the airway     Puree   1) Material does not enter the airway     Soft Solid   1) Material does not enter the airway     Regular Solid   1) Material does not enter the airway       Esophageal Phase  Suspect slow drainage in the upper 1/3 of the esophagus and possible backflow. Recommend gastroenterology follow-up to determine presence of possible esophageal dysphagia. Following Evaluation:  Provided education regarding role of SLP, results of assessment, recommendations and general speech pathology plan of care. [x] Pt verbalized understanding and agreement   [] Pt requires ongoing learning   [] No evidence of comprehension     Timed Code Treatment: 0 minutes    Total Treatment Time: 30 minutes    Signature:  Fernanda [id number]  Speech-Language Pathologist    The speech-language pathologist was present, directed the patient's care, made skilled judgment and was responsible for assessment and treatment.     ANA Mcnair.  Clinician

## 2022-06-14 ENCOUNTER — OFFICE VISIT (OUTPATIENT)
Dept: INTERNAL MEDICINE CLINIC | Age: 87
End: 2022-06-14
Payer: MEDICARE

## 2022-06-14 VITALS
HEIGHT: 63 IN | SYSTOLIC BLOOD PRESSURE: 90 MMHG | WEIGHT: 83.6 LBS | DIASTOLIC BLOOD PRESSURE: 64 MMHG | BODY MASS INDEX: 14.81 KG/M2 | HEART RATE: 76 BPM

## 2022-06-14 DIAGNOSIS — N18.31 STAGE 3A CHRONIC KIDNEY DISEASE (HCC): ICD-10-CM

## 2022-06-14 DIAGNOSIS — R41.3 MEMORY DISTURBANCE: ICD-10-CM

## 2022-06-14 DIAGNOSIS — R05.9 COUGH: ICD-10-CM

## 2022-06-14 DIAGNOSIS — R41.89 COGNITIVE IMPAIRMENT: Primary | ICD-10-CM

## 2022-06-14 PROBLEM — N18.30 CHRONIC RENAL DISEASE, STAGE III (HCC): Status: ACTIVE | Noted: 2022-06-14

## 2022-06-14 LAB
TSH REFLEX FT4: 3.85 UIU/ML (ref 0.27–4.2)
VITAMIN B-12: 426 PG/ML (ref 211–911)

## 2022-06-14 PROCEDURE — 1123F ACP DISCUSS/DSCN MKR DOCD: CPT | Performed by: INTERNAL MEDICINE

## 2022-06-14 PROCEDURE — 99214 OFFICE O/P EST MOD 30 MIN: CPT | Performed by: INTERNAL MEDICINE

## 2022-06-14 NOTE — PROGRESS NOTES
disturbance    - TSH with Reflex to FT4    3. Cough  Suspect seasonal allergies versus viral upper respiratory infection. Symptomatic treatment and monitor. 4. Stage 3a chronic kidney disease (HCC)  Chronic and stable. Continue risk factor modification.     RTO 6 weeks

## 2022-07-22 RX ORDER — OMEPRAZOLE 20 MG/1
CAPSULE, DELAYED RELEASE ORAL
Qty: 90 CAPSULE | Refills: 0 | Status: SHIPPED | OUTPATIENT
Start: 2022-07-22 | End: 2022-08-23

## 2022-08-23 ENCOUNTER — TELEPHONE (OUTPATIENT)
Dept: INTERNAL MEDICINE CLINIC | Age: 87
End: 2022-08-23

## 2022-08-23 ENCOUNTER — OFFICE VISIT (OUTPATIENT)
Dept: INTERNAL MEDICINE CLINIC | Age: 87
End: 2022-08-23
Payer: MEDICARE

## 2022-08-23 VITALS
WEIGHT: 76.4 LBS | SYSTOLIC BLOOD PRESSURE: 94 MMHG | BODY MASS INDEX: 13.54 KG/M2 | DIASTOLIC BLOOD PRESSURE: 58 MMHG | HEART RATE: 60 BPM | HEIGHT: 63 IN

## 2022-08-23 DIAGNOSIS — K22.0 ACHALASIA: ICD-10-CM

## 2022-08-23 DIAGNOSIS — G30.1 LATE ONSET ALZHEIMER'S DEMENTIA WITHOUT BEHAVIORAL DISTURBANCE (HCC): ICD-10-CM

## 2022-08-23 DIAGNOSIS — E43 SEVERE PROTEIN-CALORIE MALNUTRITION (HCC): Primary | ICD-10-CM

## 2022-08-23 DIAGNOSIS — F02.80 LATE ONSET ALZHEIMER'S DEMENTIA WITHOUT BEHAVIORAL DISTURBANCE (HCC): ICD-10-CM

## 2022-08-23 PROCEDURE — 99214 OFFICE O/P EST MOD 30 MIN: CPT | Performed by: INTERNAL MEDICINE

## 2022-08-23 PROCEDURE — 1123F ACP DISCUSS/DSCN MKR DOCD: CPT | Performed by: INTERNAL MEDICINE

## 2022-08-23 ASSESSMENT — PATIENT HEALTH QUESTIONNAIRE - PHQ9
2. FEELING DOWN, DEPRESSED OR HOPELESS: 0
SUM OF ALL RESPONSES TO PHQ QUESTIONS 1-9: 0
1. LITTLE INTEREST OR PLEASURE IN DOING THINGS: 0
SUM OF ALL RESPONSES TO PHQ QUESTIONS 1-9: 0
SUM OF ALL RESPONSES TO PHQ9 QUESTIONS 1 & 2: 0

## 2022-08-23 NOTE — TELEPHONE ENCOUNTER
Marina Enrique from [address] calling. Wanted to inform Trey Andersen they did get pt set up for a visit today at 3:00.

## 2022-08-23 NOTE — PROGRESS NOTES
Chief Complaint   Patient presents with    Other     6 wk f/u cognitive impairment - memory is much worse. Fall     Pt has had multiple falls - dtr believes she just loses her balance. She is not really eating much. HPI:  The patient is here with her daughter to address recent decline in health. Over the last few weeks she has had increasing weakness, confusion, and several falls. She has trouble eating due to achalasia. Eating causes reflux and a cough. Her dementia has been progressing and she becomes more frequently confused. Yesterday she came to stay with her daughter and the plan is for her to remain at her daughter's house for the foreseeable future. She is not currently taking any medications. Appetite is poor and she is not eating much. The patient denies any pain or other active concerns at this time. Exam  BP (!) 94/58   Pulse 60   Ht 5' 3\" (1.6 m)   Wt 76 lb 6.4 oz (34.7 kg)   BMI 13.53 kg/m²   General: Elderly female, frail in appearance, not in acute distress  Cardiovascular: Regular rate and rhythm, no murmurs, no edema  Respiratory: Breath sounds are clear to auscultation bilaterally  GI: The abdomen is soft and nondistended  Neuro: She is alert and oriented and responds appropriately to questions    Lab Results   Component Value Date    CREATININE 0.9 10/12/2021    BUN 15 10/12/2021     10/12/2021    K 4.4 10/12/2021     10/12/2021    CO2 21 10/12/2021     Lab Results   Component Value Date    WBC 9.2 09/03/2020    HGB 14.9 09/03/2020    HCT 44.4 09/03/2020    MCV 94.6 09/03/2020     09/03/2020      A/P     Diagnosis Orders   1. Severe protein-calorie malnutrition [address]      2. [facility] [address]      3.  Late onset Alzheimer's dementia without behavioral disturbance [address]      The patient is having progressive weight loss, severe malnutrition, increased frailty with falls and progressive cognitive deficit. She is no longer able to live independently. Her appetite response is blunted and her daughter has had trouble getting her to increase her food intake. This is confounded by the presence of achalasia causing reflux and coughing when eating. She is at high risk of progression of illness and hospitalization related to the above issues. We had a detailed discussion about the patient's prognosis and the different options going forward. The patient and her family are not interested in pursuing aggressive care at this time. Given the balance of risk and benefits and her advanced age and frail state, this is a reasonable approach in my opinion. We discussed hospice care and hospice services. They are interested in pursuing hospice care. A referral to hospice has been placed today. After initial consultation with the hospice team if the patient remains interested we will proceed with hospice admission orders.

## 2022-08-26 ENCOUNTER — TELEPHONE (OUTPATIENT)
Dept: INTERNAL MEDICINE CLINIC | Age: 87
End: 2022-08-26

## 2022-08-26 NOTE — TELEPHONE ENCOUNTER
[facility] is calling to let you know pt passed away at 8:50 am this morning  Thanks.